# Patient Record
Sex: MALE | Race: WHITE | NOT HISPANIC OR LATINO | Employment: STUDENT | URBAN - NONMETROPOLITAN AREA
[De-identification: names, ages, dates, MRNs, and addresses within clinical notes are randomized per-mention and may not be internally consistent; named-entity substitution may affect disease eponyms.]

---
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
## 2018-01-27 VITALS
DIASTOLIC BLOOD PRESSURE: 82 MMHG | SYSTOLIC BLOOD PRESSURE: 118 MMHG | HEART RATE: 78 BPM | WEIGHT: 161 LBS | HEIGHT: 66 IN | BODY MASS INDEX: 25.88 KG/M2

## 2018-02-02 ASSESSMENT — PATIENT HEALTH QUESTIONNAIRE - PHQ9: SUM OF ALL RESPONSES TO PHQ QUESTIONS 1-9: 0

## 2018-03-06 ENCOUNTER — DOCUMENTATION ONLY (OUTPATIENT)
Dept: FAMILY MEDICINE | Facility: OTHER | Age: 18
End: 2018-03-06

## 2019-04-18 ENCOUNTER — THERAPY VISIT (OUTPATIENT)
Dept: CHIROPRACTIC MEDICINE | Facility: OTHER | Age: 19
End: 2019-04-18
Attending: CHIROPRACTOR
Payer: COMMERCIAL

## 2019-04-18 DIAGNOSIS — M62.838 SPASM OF MUSCLE: ICD-10-CM

## 2019-04-18 DIAGNOSIS — M99.02 SEGMENTAL AND SOMATIC DYSFUNCTION OF THORACIC REGION: Primary | ICD-10-CM

## 2019-04-18 DIAGNOSIS — M54.6 PAIN IN THORACIC SPINE: ICD-10-CM

## 2019-04-18 PROCEDURE — 98940 CHIROPRACT MANJ 1-2 REGIONS: CPT | Mod: AT | Performed by: CHIROPRACTOR

## 2019-04-18 PROCEDURE — 99202 OFFICE O/P NEW SF 15 MIN: CPT | Mod: 25 | Performed by: CHIROPRACTOR

## 2019-04-18 NOTE — PROGRESS NOTES
PATIENT:  Odell Contreras is a 18 year old male presenting for mid back pain    PROBLEM:   Date of Initial Visit for this Episode:  4/18/2019    Visit #1    SUBJECTIVE / HPI: Patient presents with primary complaints of mid back pain.  Patient reports that he was performing a back workout a couple of days ago.  Patient reports that he was mildly sore following the workout however he believed that symptoms would resolve on their own.  Patient notes symptoms persisted and upon waking this morning he noted that his symptoms were ranked at their current levels.  Because of this patient presents to our office for further evaluation and treatment  Description and onset:  Duration and Frequency of Pain: Couple of days and constant and sharp  Radiation of pain: No  Pain rated at it's worst: 7/10  Pain rated currently:  3/10  Pain course: Gradually getting worse  Worse with:  sitting, standing and walking  Improved by:  No Treatment tried to date  Additional Features: Unremarkable  Other Health Care Providers seen for this: Shayne Gonzales DC  Previous treatment: Chiropractic  Previous injury: Patient has history of occasional mid back pain symptoms following exercising.  However this is inconsistent no other history of trauma to patient's back.  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          See flowsheets in chart for details.  4/18/2019    Oswestry (YOLY) Questionnaire    OSWESTRY DISABILITY INDEX 4/18/2019   Count 9   Sum 12   Oswestry Score (%) 26.67        Functional limitations: Lifting, standing, walking, socializing, and performing personal care activities    Exercise habits: Symptoms worsened upon waking this morning.  Patient notes however in the past after performing back day exercises experiencing a worsening of these symptoms this is inconsistent.  Sleeping habits: Unaffected    Past D.C. Care: yes, helpful             PAST MEDICAL HISTORY:  History reviewed. No pertinent past  medical history.    PAST SURGICAL HISTORY:  No past surgical history on file.    ALLERGIES:  Allergies not on file    CURRENT MEDICATIONS:  No current outpatient medications on file.       SOCIAL HISTORY:  Marital Status: Not on file  Children: Not on file  Occupation: Student at [employer].  Alcohol use:none.  Tobacco use: Smoker: no.      FAMILY HISTORY:  History reviewed. No pertinent family history.    There is no problem list on file for this patient.        ROS:  The patient denies any fevers, chills, nausea, vomiting, diarrhea, constipation,dysuria, hematuria, or urinary hesitancy or incontinence.  No shortness of breath, chest pain, or rashes.    OBJECTIVE:    DIAGNOSTICS: No current spinal imaging taken.     PHYSICAL EXAM:     GENERAL APPEARANCE: healthy, alert, active, mild distress and cooperative   GAIT: NORMAL    MUSCULOSKELETAL:   Posture: Generally good posture.  Patient is antalgic to the right.  Right shoulder appears depressed when compared to the left.  Gait:  unremarkable.     Cervical  ROM:   smooth/halting arc of motion   Unremarkable    -Maximal Foraminal Compression: Negative   Shoulder Depression: Negative  -Distraction negative      Thoracic and Lumbar  ROM:  50/60 flexion 45/60 extension    45/45 RLF    35/45 LLF   40/45 RR      35/45 LR    +Kemps: left T-spine      +Tenderness: Present along the left thoracic spine.  Primary points of pain include T3, T6, and T9.  +Muscle spasm: Left latissimus dorsi, left rhomboids.  Taut and tender fibers are present of the T-spine paraspinals bilaterally from the intrascapular region to just below the scapular level primarily on the left side.  Taut and tender fibers are also noted of the upper trapezius bilaterally and left levator scapula.  +Joint asymmetry and restriction: T3 with extension left lateral flexion, T6 with flexion, T8 with extension    ASSESSMENT: Odell Contreras is a 18 year old male presenting with mid back pain.  Patient appears to be a good  candidate for chiropractic care.  No contraindications, yellow flakes or other signs to indicate that chiropractic care should not be provided on today's visit.  We will also provide patient with home exercises/therapies as this seems to be a recurring problem for the patient.  She was ingredients to this care of plan at this time.     1. Segmental and somatic dysfunction of thoracic region    2. Pain in thoracic spine    3. Spasm of muscle        PLAN    Evaluation and Management:  18739 Low to moderate level exam 20 min    Procedures:  Modalities:  None performed this visit    CMT:  72868 Chiropractic manipulative treatment 1-2 regions performed   Thoracic: Diversified, T3, T6, T8, Prone    Therapeutic procedures:  Patient was instructed to utilize foam rolling primarily of the left latissimus dorsi muscle as well as the T-spine paraspinals and rhomboid muscles.  Patient was also educated on scapular strengthening exercises primarily those of rows and serratus push-ups.  This is done to promote scapular stability thus providing additional stability to the thoracic spine.  She was also educated on latissimus dorsi muscle stretches.  This is done to promote improve ranges of motion primarily of the glenohumeral joint as well as scapular articulation with the thoracic spine and rib cage.    Response to Treatment  Reduction in symptoms as reported by patient    Prognosis: Excellent    4/18/2019 Plan of Care:  4-6 visits of Chiropractic Care including Spinal Adjustments and/or physiotherapy and active rehabilitation, to include exercises in the office and/or at home to meet care plan goals.     Frequency: 1-2xweek for up to 4 weeks. A reevaluation would be clinically appropriate in 4-6 visits, to determine progress and further course of care.    POC discussed and patient agreeable to plan of care.      4/18/2019 Goals:      Patient will report improved pain.   Patient will report able to exercise without painful  limits.   Right patient with effective home therapies including stretching and strengthening exercises.   Patient will demonstrate an improved ability to complete Activities of Daily Living  as shown by a reported 10% reduced score on  back index.    Patient will demonstrate improved ROM.        INSTRUCTIONS   ice 20 minutes every other hour as needed, heat 15 minutes every other hour as needed and stretch as instructed at visit    Follow-up:  Return to care in 1 day.        Disclaimer: This note consists of symbols derived from keyboarding, dictation and/or voice recognition software. As a result, there may be errors in the script that have gone undetected. Please consider this when interpreting information found in this chart.

## 2019-04-19 ENCOUNTER — THERAPY VISIT (OUTPATIENT)
Dept: CHIROPRACTIC MEDICINE | Facility: OTHER | Age: 19
End: 2019-04-19
Attending: CHIROPRACTOR
Payer: COMMERCIAL

## 2019-04-19 DIAGNOSIS — M99.02 SEGMENTAL AND SOMATIC DYSFUNCTION OF THORACIC REGION: Primary | ICD-10-CM

## 2019-04-19 DIAGNOSIS — M54.6 PAIN IN THORACIC SPINE: ICD-10-CM

## 2019-04-19 DIAGNOSIS — M62.838 SPASM OF MUSCLE: ICD-10-CM

## 2019-04-19 PROCEDURE — 98940 CHIROPRACT MANJ 1-2 REGIONS: CPT | Mod: AT | Performed by: CHIROPRACTOR

## 2019-04-19 NOTE — PROGRESS NOTES
Visit #:  2    Subjective:  Odell Contreras is a 18 year old male who is seen in f/u up for:        Segmental and somatic dysfunction of thoracic region  Pain in thoracic spine  Spasm of muscle.     Since last visit on 4/18/2019,  Odell Contreras reports:    Area of chief complaint:  Thoracic : Patient notes symptoms ranked between 0-4 out of 10 on a pain scale.  Patient reports following treatment yesterday noticing improvement of symptoms.  Patient utilized ice, sauna, hot tub to help with pain symptoms and noted that this was very beneficial.  Patient also was compliant with foam rolling and stretching recommendations as recommended and found these to be beneficial as well.  Patient notes bending still causes a pulling sensation into his mid back however he feels that he is improving at this time.           Objective:  The following was observed:    P: palpatory tendernessPresent of the intrascapular region primarily on the left side:    A: static palpation demonstrates intersegmental asymmetry , thoracic  R: motion palpation notes restricted motion, T3 and T6  T: muscle spasm at level(s): Present along the attachment of the left latissimus dorsi.  Mild spasms are also noted of the left T-spine paraspinals.  Taut and tender fibers are present of the right T-spine paraspinals as well as the left rhomboids.:      Segmental spinal dysfunction/restrictions found at:  :  T3 Left lateral flexion restricted and Extension restriction  T6 Extension restriction.      Assessment: Patient does appear to be showing signs of improvement.  Spasms are reducing as is muscular tension.  Patient has been compliant with recommendations for home therapies such as stretching, and foam rolling.  On today's visit patient did inquire about home TENS unit.  I did instruct the patient to follow guidelines per 's recommendations but this would likely be helpful in managing his pain symptoms.  Patient may require 1-2 additional  follow-up visits.    Diagnoses:      1. Segmental and somatic dysfunction of thoracic region    2. Pain in thoracic spine    3. Spasm of muscle        Patient's condition:  Patient had restrictions pre-manipulation and Patient is noticing a decrease in their symptoms    Treatment effectiveness:  Post manipulation there is better intersegmental movement, Patient claims to feel looser post manipulation, Patients symptoms are getting better, Tenderness is decreasing and Muscle spasm is reducing      Procedures:  CMT:  22932 Chiropractic manipulative treatment 1-2 regions performed   Thoracic: Diversified, T3, T6, Prone    Modalities:  None performed this visit    Therapeutic procedures:  None    Response to Treatment  Reduction in symptoms as reported by patient    Prognosis: Excellent    Progress towards Goals: Patient is making progress towards the goal.     Recommendations:    Instructions:ice 20 minutes every other hour as needed, heat 15 minutes every other hour as needed and stretch as instructed at visit    Follow-up:  Recommendation 1-2 additional visits.

## 2019-04-19 NOTE — PATIENT INSTRUCTIONS
ice 20 minutes every other hour as needed, heat 15 minutes every other hour as needed and stretch as instructed at visit

## 2020-03-11 ENCOUNTER — HEALTH MAINTENANCE LETTER (OUTPATIENT)
Age: 20
End: 2020-03-11

## 2020-12-27 ENCOUNTER — HEALTH MAINTENANCE LETTER (OUTPATIENT)
Age: 20
End: 2020-12-27

## 2021-04-25 ENCOUNTER — HEALTH MAINTENANCE LETTER (OUTPATIENT)
Age: 21
End: 2021-04-25

## 2021-10-09 ENCOUNTER — HEALTH MAINTENANCE LETTER (OUTPATIENT)
Age: 21
End: 2021-10-09

## 2022-05-21 ENCOUNTER — HEALTH MAINTENANCE LETTER (OUTPATIENT)
Age: 22
End: 2022-05-21

## 2022-09-17 ENCOUNTER — HEALTH MAINTENANCE LETTER (OUTPATIENT)
Age: 22
End: 2022-09-17

## 2023-06-04 ENCOUNTER — HEALTH MAINTENANCE LETTER (OUTPATIENT)
Age: 23
End: 2023-06-04